# Patient Record
Sex: FEMALE | Race: WHITE | NOT HISPANIC OR LATINO | Employment: FULL TIME | URBAN - METROPOLITAN AREA
[De-identification: names, ages, dates, MRNs, and addresses within clinical notes are randomized per-mention and may not be internally consistent; named-entity substitution may affect disease eponyms.]

---

## 2021-09-22 ENCOUNTER — OFFICE VISIT (OUTPATIENT)
Dept: INTERNAL MEDICINE | Facility: CLINIC | Age: 31
End: 2021-09-22

## 2021-09-22 VITALS
HEIGHT: 64 IN | OXYGEN SATURATION: 96 % | SYSTOLIC BLOOD PRESSURE: 100 MMHG | DIASTOLIC BLOOD PRESSURE: 70 MMHG | BODY MASS INDEX: 22.65 KG/M2 | TEMPERATURE: 96.9 F | HEART RATE: 82 BPM | WEIGHT: 132.7 LBS

## 2021-09-22 DIAGNOSIS — F90.2 ATTENTION DEFICIT HYPERACTIVITY DISORDER (ADHD), COMBINED TYPE: Primary | ICD-10-CM

## 2021-09-22 LAB
ALBUMIN SERPL-MCNC: 5.1 G/DL (ref 3.5–5.2)
ALBUMIN/GLOB SERPL: 2.4 G/DL
ALP SERPL-CCNC: 68 U/L (ref 39–117)
ALT SERPL-CCNC: 36 U/L (ref 1–33)
AST SERPL-CCNC: 31 U/L (ref 1–32)
BASOPHILS # BLD AUTO: 0.05 10*3/MM3 (ref 0–0.2)
BASOPHILS NFR BLD AUTO: 0.7 % (ref 0–1.5)
BILIRUB SERPL-MCNC: 0.8 MG/DL (ref 0–1.2)
BUN SERPL-MCNC: 14 MG/DL (ref 6–20)
BUN/CREAT SERPL: 19.7 (ref 7–25)
CALCIUM SERPL-MCNC: 9.3 MG/DL (ref 8.6–10.5)
CHLORIDE SERPL-SCNC: 100 MMOL/L (ref 98–107)
CO2 SERPL-SCNC: 28 MMOL/L (ref 22–29)
CREAT SERPL-MCNC: 0.71 MG/DL (ref 0.57–1)
EOSINOPHIL # BLD AUTO: 0.15 10*3/MM3 (ref 0–0.4)
EOSINOPHIL NFR BLD AUTO: 2.1 % (ref 0.3–6.2)
ERYTHROCYTE [DISTWIDTH] IN BLOOD BY AUTOMATED COUNT: 12.8 % (ref 12.3–15.4)
GLOBULIN SER CALC-MCNC: 2.1 GM/DL
GLUCOSE SERPL-MCNC: 92 MG/DL (ref 65–99)
HCT VFR BLD AUTO: 42.7 % (ref 34–46.6)
HGB BLD-MCNC: 14.2 G/DL (ref 12–15.9)
IMM GRANULOCYTES # BLD AUTO: 0.04 10*3/MM3 (ref 0–0.05)
IMM GRANULOCYTES NFR BLD AUTO: 0.6 % (ref 0–0.5)
LYMPHOCYTES # BLD AUTO: 2.21 10*3/MM3 (ref 0.7–3.1)
LYMPHOCYTES NFR BLD AUTO: 31 % (ref 19.6–45.3)
MCH RBC QN AUTO: 29.7 PG (ref 26.6–33)
MCHC RBC AUTO-ENTMCNC: 33.3 G/DL (ref 31.5–35.7)
MCV RBC AUTO: 89.3 FL (ref 79–97)
MONOCYTES # BLD AUTO: 0.55 10*3/MM3 (ref 0.1–0.9)
MONOCYTES NFR BLD AUTO: 7.7 % (ref 5–12)
NEUTROPHILS # BLD AUTO: 4.13 10*3/MM3 (ref 1.7–7)
NEUTROPHILS NFR BLD AUTO: 57.9 % (ref 42.7–76)
NRBC BLD AUTO-RTO: 0 /100 WBC (ref 0–0.2)
PLATELET # BLD AUTO: 298 10*3/MM3 (ref 140–450)
POTASSIUM SERPL-SCNC: 4.4 MMOL/L (ref 3.5–5.2)
PROT SERPL-MCNC: 7.2 G/DL (ref 6–8.5)
RBC # BLD AUTO: 4.78 10*6/MM3 (ref 3.77–5.28)
SODIUM SERPL-SCNC: 138 MMOL/L (ref 136–145)
WBC # BLD AUTO: 7.13 10*3/MM3 (ref 3.4–10.8)

## 2021-09-22 PROCEDURE — 99204 OFFICE O/P NEW MOD 45 MIN: CPT | Performed by: STUDENT IN AN ORGANIZED HEALTH CARE EDUCATION/TRAINING PROGRAM

## 2021-09-22 RX ORDER — DEXTROAMPHETAMINE SACCHARATE, AMPHETAMINE ASPARTATE, DEXTROAMPHETAMINE SULFATE AND AMPHETAMINE SULFATE 3.75; 3.75; 3.75; 3.75 MG/1; MG/1; MG/1; MG/1
15 TABLET ORAL 2 TIMES DAILY
Qty: 60 TABLET | Status: CANCELLED | OUTPATIENT
Start: 2021-09-22

## 2021-09-22 RX ORDER — ESCITALOPRAM OXALATE 20 MG/1
20 TABLET ORAL DAILY
COMMUNITY
End: 2022-04-15 | Stop reason: SDUPTHER

## 2021-09-22 RX ORDER — DEXTROAMPHETAMINE SACCHARATE, AMPHETAMINE ASPARTATE, DEXTROAMPHETAMINE SULFATE AND AMPHETAMINE SULFATE 3.75; 3.75; 3.75; 3.75 MG/1; MG/1; MG/1; MG/1
TABLET ORAL
COMMUNITY
End: 2021-09-30 | Stop reason: SDUPTHER

## 2021-09-22 NOTE — PROGRESS NOTES
Joel Manley D.O.  Internal Medicine  [facility]  [address]  [place] [address]  [phone]      Chief Complaint  Establish Care, Annual Exam, Med Refill, and Anxiety    SUBJECTIVE    History of Present Illness    Suki Lopez is a 31 y.o. female who presents to the office today as a new patient to establish care.     She has been seeing [facility],a  nurse practitioner there but doesn't know her name.    ADHD: Diagnosed in 2011 when she had j0ust started college. She underwent a bunch of surveys and was told she had ADHD. She tried vyvanse and it didn't work very well per her report, wore off pretty quickly. Her last Rx of Adderall was approximately 2 years ago and has been taking it as needed, last dose 2 days ago. Taking 15 mg adderall twice daily.   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 She feels different when taking Adderall, but then states that it is just super focused which is not typical for her.   She feels that her primary symptoms are fidgiting and inability to stay on task.     Anxiety: she feels a dramatic improvement on 20 mg escitalopram, does not need a refill at this time.    At home sleep study was done 2 weeks ago with [facility], still pending results.       Review of Systems   Constitutional: Negative for fever.   Respiratory: Negative for shortness of breath.    Cardiovascular: Negative for chest pain and leg swelling.   Gastrointestinal: Negative for abdominal pain and blood in stool.       Allergies   Allergen Reactions   • Cefaclor Anaphylaxis   • Gadolinium Derivatives Anaphylaxis   • Penicillins Anaphylaxis     Category: Allergy;      • Sulfa Antibiotics Anaphylaxis        Outpatient Medications Marked as Taking for the 9/22/21 encounter (Office Visit) with Joel Manley, DO   Medication Sig Dispense Refill   • amphetamine-dextroamphetamine (ADDERALL) 15 MG tablet Take  by mouth.     •  "escitalopram (LEXAPRO) 20 MG tablet Take 20 mg by mouth Daily.          Past Medical History:   Diagnosis Date   • ADHD (attention deficit hyperactivity disorder)    • Anxiety      Past Surgical History:   Procedure Laterality Date   • APPENDECTOMY  2015   • EYE MUSCLE SURGERY     • FINGER FRACTURE SURGERY Right 2000    2nd finger     Family History   Problem Relation Age of Onset   • Anxiety disorder Mother    • Sleep apnea Mother    • Diabetes type II Mother    • No Known Problems Father    • No Known Problems Brother    • Stroke Maternal Grandmother    • Diabetes type II Maternal Grandmother    • Breast cancer Paternal Grandmother 70   • Prostate cancer Paternal Grandfather     reports that she has never smoked. She has never used smokeless tobacco. She reports current alcohol use of about 3.0 standard drinks of alcohol per week. She reports that she does not use drugs.    OBJECTIVE    Vital Signs:   /70   Pulse 82   Temp 96.9 °F (36.1 °C) (Temporal)   Ht 162.6 cm (64\")   Wt 60.2 kg (132 lb 11.2 oz)   SpO2 96%   BMI 22.78 kg/m²     Physical Exam  Vitals reviewed.   Constitutional:       Appearance: She is normal weight.   HENT:      Head: Normocephalic and atraumatic.      Right Ear: Tympanic membrane, ear canal and external ear normal. There is no impacted cerumen.      Left Ear: Tympanic membrane, ear canal and external ear normal. There is no impacted cerumen.   Eyes:      General: No scleral icterus.     Pupils: Pupils are equal, round, and reactive to light.   Cardiovascular:      Rate and Rhythm: Normal rate and regular rhythm.      Heart sounds: Normal heart sounds. No murmur heard.     Pulmonary:      Effort: Pulmonary effort is normal. No respiratory distress.      Breath sounds: Normal breath sounds. No wheezing.   Lymphadenopathy:      Cervical: No cervical adenopathy.   Neurological:      Mental Status: She is alert and oriented to person, place, and time.   Psychiatric:         Mood and " Affect: Mood normal.         Behavior: Behavior normal.                             ASSESSMENT & PLAN     Diagnoses and all orders for this visit:    1. Attention deficit hyperactivity disorder (ADHD), combined type (Primary)  2. Anxiety Disorder  -Pt has approximate 10 year history of ADHD, previously prescribed Vyvanse and Adderall with no improvement on Vyvanse but improvement on Adderall. Adult ADHD-RS-IV today in office is 34, combined type but worse in attention deficit category. SAIMA reviewed and is negative for past year. She does been taking Adderall 15 mg twice daily as needed with a refill that is several years old, but with her residency intensifying she is going to need regular medication. She has concomitant anxiety which feels well controlled on escitalopram 20 mg daily per her report. PHQ9 is 4 and JOSH 7 is 4 today as well. Discussed atomoxetine with the patient as a noncontrolled treatment for ADHD but she is comfortable with Adderall. Will obtain compliance drug screen today and check renal function and continue Adderall for her after I get those results.  -     Compliance Drug Analysis, Ur - Urine, Clean Catch  -     Comprehensive metabolic panel  -     CBC w AUTO Differential                The following social determinates of health impact the patient's medical decision making: No social determinates of health were factored in to today's visit.     Follow Up  Return in about 4 weeks (around 10/20/2021) for Annual physical.    Patient/family had no further questions at this time and verbalized understanding of the plan discussed today.

## 2021-09-24 ENCOUNTER — PATIENT ROUNDING (BHMG ONLY) (OUTPATIENT)
Dept: INTERNAL MEDICINE | Facility: CLINIC | Age: 31
End: 2021-09-24

## 2021-09-24 NOTE — PROGRESS NOTES
September 24, 2021    Hello, may I speak with Suki Lopez?    My name is Ina Kasey, Practice Manager    I am  with [facility]  [address] [address] [address] [phone].    Before we get started may I verify your date of birth? 1990    I am calling to officially welcome you to our practice and ask about your recent visit. Is this a good time to talk?     Tell me about your visit with us. LEFT VOICE MESSAGE       We're always looking for ways to make our patients' experiences even better. Do you have recommendations on ways we may improve?  {    Overall were you satisfied with your first visit to our practice?       I appreciate you taking the time to speak with me today. Is there anything else I can do for you      Thank you, and have a great day.

## 2021-09-27 ENCOUNTER — PATIENT MESSAGE (OUTPATIENT)
Dept: INTERNAL MEDICINE | Facility: CLINIC | Age: 31
End: 2021-09-27

## 2021-09-27 NOTE — TELEPHONE ENCOUNTER
From: Suki Lopez  To: Joel Manley DO  Sent: 9/27/2021 10:23 AM EDT  Subject: Prescription Question    Good morning Dr. Manley    I wanted to check and see if the UDS from last week was resulted and if the adderall was able to be sent. If there is anything I need to do on my end, please let me know.    Thank you!    Susanna Lopez

## 2021-09-29 LAB — DRUGS UR: NORMAL

## 2021-09-30 DIAGNOSIS — F90.2 ATTENTION DEFICIT HYPERACTIVITY DISORDER (ADHD), COMBINED TYPE: Primary | ICD-10-CM

## 2021-09-30 RX ORDER — DEXTROAMPHETAMINE SACCHARATE, AMPHETAMINE ASPARTATE, DEXTROAMPHETAMINE SULFATE AND AMPHETAMINE SULFATE 3.75; 3.75; 3.75; 3.75 MG/1; MG/1; MG/1; MG/1
15 TABLET ORAL 2 TIMES DAILY
Qty: 60 TABLET | Refills: 0 | Status: SHIPPED | OUTPATIENT
Start: 2021-09-30 | End: 2021-11-09 | Stop reason: SDUPTHER

## 2021-10-18 ENCOUNTER — OFFICE VISIT (OUTPATIENT)
Dept: INTERNAL MEDICINE | Facility: CLINIC | Age: 31
End: 2021-10-18

## 2021-10-18 VITALS
DIASTOLIC BLOOD PRESSURE: 60 MMHG | TEMPERATURE: 97.4 F | SYSTOLIC BLOOD PRESSURE: 110 MMHG | BODY MASS INDEX: 22.36 KG/M2 | HEART RATE: 87 BPM | OXYGEN SATURATION: 95 % | HEIGHT: 64 IN | WEIGHT: 131 LBS

## 2021-10-18 DIAGNOSIS — Z23 NEED FOR IMMUNIZATION AGAINST INFLUENZA: ICD-10-CM

## 2021-10-18 DIAGNOSIS — F90.2 ATTENTION DEFICIT HYPERACTIVITY DISORDER (ADHD), COMBINED TYPE: Primary | ICD-10-CM

## 2021-10-18 PROCEDURE — 90686 IIV4 VACC NO PRSV 0.5 ML IM: CPT | Performed by: STUDENT IN AN ORGANIZED HEALTH CARE EDUCATION/TRAINING PROGRAM

## 2021-10-18 PROCEDURE — 90471 IMMUNIZATION ADMIN: CPT | Performed by: STUDENT IN AN ORGANIZED HEALTH CARE EDUCATION/TRAINING PROGRAM

## 2021-10-18 PROCEDURE — 99214 OFFICE O/P EST MOD 30 MIN: CPT | Performed by: STUDENT IN AN ORGANIZED HEALTH CARE EDUCATION/TRAINING PROGRAM

## 2021-10-18 NOTE — PROGRESS NOTES
Joel Manley D.O.  Internal Medicine  [facility]  [address]  [place] [address]  [phone]      Chief Complaint  Med Refill    SUBJECTIVE    History of Present Illness    Suki Lopez is a 31 y.o. female who presents to the office today as an established patient that last saw me on 9/22/2021. Pt is here for short term follow up of ADHD.     ADHD: like night and day since starting adderall 15 mg twice daily at last visit, she is now asking herself why she even stopped it before. No palpitations, no difficulty sleep. Notes her appetite is a little less. 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   Anxiety: feels like it is controlled on escitalopram.     She is applying to pediatric heme/onc fellowship right now, will likely be moving somewhere else in the country within less than a year and would like to defer annual exam until that time. 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       Allergies   Allergen Reactions   • Cefaclor Anaphylaxis   • Gadolinium Derivatives Anaphylaxis   • Penicillins Anaphylaxis     Category: Allergy;      • Sulfa Antibiotics Anaphylaxis        Outpatient Medications Marked as Taking for the 10/18/21 encounter (Office Visit) with Joel Manley, DO   Medication Sig Dispense Refill   • amphetamine-dextroamphetamine (ADDERALL) 15 MG tablet Take 1 tablet by mouth 2 (Two) Times a Day. 60 tablet 0   • escitalopram (LEXAPRO) 20 MG tablet Take 20 mg by mouth Daily.          Past Medical History:   Diagnosis Date   • ADHD (attention deficit hyperactivity disorder)    • Anxiety      Past Surgical History:   Procedure Laterality Date   • APPENDECTOMY  2015   • EYE MUSCLE SURGERY     • FINGER FRACTURE SURGERY Right 2000    2nd finger     Family History   Problem Relation Age of Onset   • Anxiety disorder Mother    • Sleep apnea Mother    • Diabetes type II Mother   "  • No Known Problems Father    • No Known Problems Brother    • Stroke Maternal Grandmother    • Diabetes type II Maternal Grandmother    • Breast cancer Paternal Grandmother 70   • Prostate cancer Paternal Grandfather     reports that she has never smoked. She has never used smokeless tobacco. She reports current alcohol use of about 3.0 standard drinks of alcohol per week. She reports that she does not use drugs.    OBJECTIVE    Vital Signs:   /60   Pulse 87   Temp 97.4 °F (36.3 °C) (Temporal)   Ht 162.6 cm (64\")   Wt 59.4 kg (131 lb)   SpO2 95%   BMI 22.49 kg/m²     Physical Exam  Vitals reviewed.   Constitutional:       General: She is not in acute distress.     Appearance: Normal appearance. She is not ill-appearing.   HENT:      Head: Normocephalic and atraumatic.   Eyes:      General: No scleral icterus.  Cardiovascular:      Rate and Rhythm: Normal rate and regular rhythm.      Heart sounds: Normal heart sounds. No murmur heard.      Pulmonary:      Effort: Pulmonary effort is normal. No respiratory distress.      Breath sounds: Normal breath sounds. No wheezing.   Neurological:      Mental Status: She is alert.   Psychiatric:         Mood and Affect: Mood normal.         Behavior: Behavior normal.            The following data was reviewed by: Joel Manley DO on 10/18/2021:  Common labs    Common Labsle 9/22/21 9/22/21    0921 0921   Glucose 92    BUN 14    Creatinine 0.71    eGFR Non  Am 97    eGFR African Am 118    Sodium 138    Potassium 4.4    Chloride 100    Calcium 9.3    Total Protein 7.2    Albumin 5.10    Total Bilirubin 0.8    Alkaline Phosphatase 68    AST (SGOT) 31    ALT (SGPT) 36 (A)    WBC  7.13   Hemoglobin  14.2   Hematocrit  42.7   Platelets  298   (A) Abnormal value       Comments are available for some flowsheets but are not being displayed.                        ASSESSMENT & PLAN     Diagnoses and all orders for this visit:    1. Attention deficit hyperactivity " disorder (ADHD), combined type (Primary)  2.Anxiety  -Pt reports significant improvement on adderall 15 mg twice daily, started last month after having an ADHD RS IV of 34. 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 Will continue current treatment and bring her back in 5 months for repeat urine drug testing and follow up. I reviewed her SAIMA today and it is appropriate only for what I am prescribing.    -Anxiety remains controlled on escitalopram 20 mg daily, continue.    3. Need for immunization against influenza  -     FluLaval/Fluarix/Fluzone >6 Months            The following social determinates of health impact the patient's medical decision making: No social determinates of health were factored in to today's visit.     Follow Up  Return in about 5 months (around 3/18/2022) for Recheck.    Patient/family had no further questions at this time and verbalized understanding of the plan discussed today.

## 2021-10-18 NOTE — PROGRESS NOTES
"    Chief Complaint  Annual checkup    HISTORY    Suki Lopez is a 31 y.o. female who presents to the office today as a  an established patient for their annual preventative exam. Their last preventative exam was _.     {Hospitalization(s) history:[id number]::\"No hospitalization(s) within the last year.\"}     New medical conditions/surgeries/providers since last visit:     Status of chronic medical conditions:    Patient's overall comments about their health or any other specific health concerns they report:    First day of last menstrual period if pre-menopausal:     Patient's contraception status (if applicable):       Health Maintenance Summary          Overdue - ANNUAL PHYSICAL (Yearly) Overdue - never done    No completion history exists for this topic.          Overdue - TDAP/TD VACCINES (1 - Tdap) Overdue - never done    No completion history exists for this topic.          Ordered - INFLUENZA VACCINE (Yearly - August to March) Ordered on 10/18/2021    10/06/2020  Imm Admin: Flu Vaccine Quad PF >36MO          Overdue - HEPATITIS C SCREENING (Once) Overdue - never done    No completion history exists for this topic.          PAP SMEAR (Every 3 Years) Next due on 10/14/2023    10/14/2020  Outside Procedure: [place] CYTOPATH CERV/VAG THIN LAYER          COVID-19 Vaccine (Series Information) Completed    01/18/2021  Imm Admin: COVID-19 (PFIZER)    12/28/2020  Imm Admin: COVID-19 (PFIZER)          Pneumococcal Vaccine 0-64 (Series Information) Aged Out    No completion history exists for this topic.                 Allergies   Allergen Reactions   • Cefaclor Anaphylaxis   • Gadolinium Derivatives Anaphylaxis   • Penicillins Anaphylaxis     Category: Allergy;      • Sulfa Antibiotics Anaphylaxis        No outpatient medications have been marked as taking for the 10/18/21 encounter (Office Visit) with Joel Manley DO.        {new/est histories:12146}    Immunization History   Administered Date(s) Administered " "  • COVID-19 (PFIZER) 12/28/2020, 01/18/2021   • Flu Vaccine Quad PF >36MO 10/06/2020   • PPD Test 05/12/2020        OBJECTIVE    Vital Signs:   /60   Pulse 87   Temp 97.4 °F (36.3 °C) (Temporal)   Ht 162.6 cm (64\")   Wt 59.4 kg (131 lb)   SpO2 95%   BMI 22.49 kg/m²     Physical Exam     {The following data was reviewed by (Optional):70819}  {Ambulatory Labs (Optional):23166}  {Data reviewed (Optional):12851:::1}           ASSESSMENT & PLAN     #Annual Preventative Health Examination   -Age and sex appropriate physical exam performed and documented. Updated past medical, family, social and surgical histories as well as allergies and care team list. Addressed care gaps listed in the medical record.  -Encouraged seat belt use for every car ride for patient and all occupants. Discussed securing of all guns in the home for patient and family protection. Encouraged sunscreen use to reduce risk of skin cancer for any days with sun exposure over 20 minutes. Recommended helmet if biking or riding motorcycle to prevent head trauma. Discussed the importance of smoke and carbon monoxide detectors in the home.   -Encouraged annual dental and vision exams as part of their overall health.  -Encouraged minimum of 30 minutes or more of exercise at a brisk walk or higher 5 days per week combined with a well-balanced diet.  -Immunizations reviewed and updated in EMR.  -Advised that all women who are planning or capable of pregnancy take a daily supplement containing 0.4 to 0.8 mg (400 to 800 ?g) of folic acid.  The ASCVD Risk score (Cantil DC Jr., et al., 2013) failed to calculate for the following reasons:    The 2013 ASCVD risk score is only valid for ages 40 to 79   -Lipid screening:  {kdmlipidcpe:16941}   -Aspirin for primary or secondary prevention: {kdmaspirincpe:09293}  -Depression screening: PHQ2 performed and the patient's screen was {POSITIVE/NEGATIVE:79927}.  -Diabetes screening:  {kdmdiabetescpe:55951} " "  -Tobacco use screening: Patient {REPORTS/DENIES EC:79878} cigarette use. Tobacco counseling was {declined/accepted/comment:66536}. Advised patient that vaping is discouraged and data is beginning to support that it is associated with adverse health outcomes as well.  -Alcohol use screening: {kdmalcoholcpe:23454}  -Illicit drug screening: Patient {DOES/DOES NOT:70824} use illicit drugs.   -Hypertension screening: {kdmhypertensioncpe:88681}  -HIV screening: {kdmhivscreeningCPE:68830}   -Syphilis screening: {kdmsyphilisscreenCPE:44732::\"Syphilis screening not indicated.\"}  -Hepatitis B virus screening: {kdmhepBcpe:43933}  -Hepatitis C virus screening:  {kdmhepCcpe:82338}  -Colon cancer screening: {kdmcoloncancerscreeningcpe:07408}  -Lung cancer screening: {kdmlungcancerscreeningcpe:51639::\"Patient has never smoked.\"}  -Cervical cancer screening:  Informed patient that the USPSTF recommends screening for cervical cancer every 3 years with cervical cytology alone in women aged 21 to 29 years. For women aged 30 to 65 years, the USPSTF recommends screening every 3 years with cervical cytology alone, every 5 years with high-risk human papillomavirus (hrHPV) testing alone, or every 5 years with HPV testing in combination with cytology (cotesting). {Lasp pap (Optional):62046}   -Breast cancer screening: Informed patient that the USPSTF recommends biennial screening mammography for women aged 50 to 74 years. {Last mammogram:47509}  -BRCA related cancer screening: Informed patient that the USPSTF recommends that primary care clinicians assess women with a personal or family history of breast, ovarian, tubal, or peritoneal cancer or who have an ancestry associated with breast cancer susceptibility 1 and 2 (BRCA1/2) gene mutations with an appropriate brief familial risk assessment tool and referred to genetic counseling if risk assessment is positive. Patient {DOES/DOES NOT:04358} have a known personal or family history. " "  -Osteoporosis screening: Informed patient that the USPSTF recommends screening for osteoporosis with bone measurement testing to prevent osteoporotic fractures in women 65 years and older. {Screening for Osteoporosis:82959::\"screening is not indicated at this time. \"}    Follow up in 1 year for annual physical exam.    Patient/family had no further questions at this time and verbalized understanding of the plan discussed today.   "

## 2021-11-09 DIAGNOSIS — F90.2 ATTENTION DEFICIT HYPERACTIVITY DISORDER (ADHD), COMBINED TYPE: ICD-10-CM

## 2021-11-10 RX ORDER — DEXTROAMPHETAMINE SACCHARATE, AMPHETAMINE ASPARTATE, DEXTROAMPHETAMINE SULFATE AND AMPHETAMINE SULFATE 3.75; 3.75; 3.75; 3.75 MG/1; MG/1; MG/1; MG/1
15 TABLET ORAL 2 TIMES DAILY
Qty: 60 TABLET | Refills: 0 | Status: SHIPPED | OUTPATIENT
Start: 2021-11-10 | End: 2021-12-09 | Stop reason: SDUPTHER

## 2021-12-09 DIAGNOSIS — F90.2 ATTENTION DEFICIT HYPERACTIVITY DISORDER (ADHD), COMBINED TYPE: ICD-10-CM

## 2021-12-10 RX ORDER — DEXTROAMPHETAMINE SACCHARATE, AMPHETAMINE ASPARTATE, DEXTROAMPHETAMINE SULFATE AND AMPHETAMINE SULFATE 3.75; 3.75; 3.75; 3.75 MG/1; MG/1; MG/1; MG/1
15 TABLET ORAL 2 TIMES DAILY
Qty: 60 TABLET | Refills: 0 | Status: SHIPPED | OUTPATIENT
Start: 2021-12-10 | End: 2022-01-11 | Stop reason: SDUPTHER

## 2022-01-11 DIAGNOSIS — F90.2 ATTENTION DEFICIT HYPERACTIVITY DISORDER (ADHD), COMBINED TYPE: ICD-10-CM

## 2022-01-12 RX ORDER — DEXTROAMPHETAMINE SACCHARATE, AMPHETAMINE ASPARTATE, DEXTROAMPHETAMINE SULFATE AND AMPHETAMINE SULFATE 3.75; 3.75; 3.75; 3.75 MG/1; MG/1; MG/1; MG/1
15 TABLET ORAL 2 TIMES DAILY
Qty: 60 TABLET | Refills: 0 | Status: SHIPPED | OUTPATIENT
Start: 2022-01-12 | End: 2022-02-14 | Stop reason: SDUPTHER

## 2022-02-14 DIAGNOSIS — F90.2 ATTENTION DEFICIT HYPERACTIVITY DISORDER (ADHD), COMBINED TYPE: ICD-10-CM

## 2022-02-14 RX ORDER — DEXTROAMPHETAMINE SACCHARATE, AMPHETAMINE ASPARTATE, DEXTROAMPHETAMINE SULFATE AND AMPHETAMINE SULFATE 3.75; 3.75; 3.75; 3.75 MG/1; MG/1; MG/1; MG/1
15 TABLET ORAL 2 TIMES DAILY
Qty: 60 TABLET | Refills: 0 | Status: SHIPPED | OUTPATIENT
Start: 2022-02-14 | End: 2022-03-16 | Stop reason: SDUPTHER

## 2022-03-16 DIAGNOSIS — F90.2 ATTENTION DEFICIT HYPERACTIVITY DISORDER (ADHD), COMBINED TYPE: ICD-10-CM

## 2022-03-16 RX ORDER — DEXTROAMPHETAMINE SACCHARATE, AMPHETAMINE ASPARTATE, DEXTROAMPHETAMINE SULFATE AND AMPHETAMINE SULFATE 3.75; 3.75; 3.75; 3.75 MG/1; MG/1; MG/1; MG/1
15 TABLET ORAL 2 TIMES DAILY
Qty: 60 TABLET | Refills: 0 | Status: SHIPPED | OUTPATIENT
Start: 2022-03-16 | End: 2022-04-15

## 2022-04-15 ENCOUNTER — OFFICE VISIT (OUTPATIENT)
Dept: INTERNAL MEDICINE | Facility: CLINIC | Age: 32
End: 2022-04-15

## 2022-04-15 VITALS
HEART RATE: 120 BPM | WEIGHT: 132 LBS | BODY MASS INDEX: 22.53 KG/M2 | DIASTOLIC BLOOD PRESSURE: 70 MMHG | HEIGHT: 64 IN | TEMPERATURE: 98 F | SYSTOLIC BLOOD PRESSURE: 102 MMHG | OXYGEN SATURATION: 98 %

## 2022-04-15 DIAGNOSIS — F41.9 ANXIETY: ICD-10-CM

## 2022-04-15 DIAGNOSIS — F90.2 ATTENTION DEFICIT HYPERACTIVITY DISORDER (ADHD), COMBINED TYPE: Primary | ICD-10-CM

## 2022-04-15 DIAGNOSIS — L85.8 KERATOSIS PILARIS: ICD-10-CM

## 2022-04-15 PROCEDURE — 99214 OFFICE O/P EST MOD 30 MIN: CPT | Performed by: STUDENT IN AN ORGANIZED HEALTH CARE EDUCATION/TRAINING PROGRAM

## 2022-04-15 RX ORDER — ESCITALOPRAM OXALATE 20 MG/1
20 TABLET ORAL DAILY
Qty: 90 TABLET | Refills: 0 | Status: SHIPPED | OUTPATIENT
Start: 2022-04-15

## 2022-04-15 RX ORDER — TRETINOIN 0.5 MG/G
1 CREAM TOPICAL NIGHTLY
Qty: 45 G | Refills: 0 | Status: SHIPPED | OUTPATIENT
Start: 2022-04-15 | End: 2022-06-20 | Stop reason: SDUPTHER

## 2022-04-15 RX ORDER — DEXTROAMPHETAMINE SACCHARATE, AMPHETAMINE ASPARTATE, DEXTROAMPHETAMINE SULFATE AND AMPHETAMINE SULFATE 5; 5; 5; 5 MG/1; MG/1; MG/1; MG/1
20 TABLET ORAL 2 TIMES DAILY
Qty: 60 TABLET | Refills: 0 | Status: SHIPPED | OUTPATIENT
Start: 2022-04-15 | End: 2022-05-19 | Stop reason: SDUPTHER

## 2022-04-15 NOTE — PROGRESS NOTES
"  Joel Manley D.O.  Internal Medicine  [facility]  [address]  [place] [address]  [phone]      Chief Complaint  ADD and Anxiety    SUBJECTIVE    History of Present Illness    Susanna Duke is a 32 y.o. female who presents to the office today as an established patient that last saw me on 10/18/2021.     ADHD: taking adderall 15 mg twice daily, states she feels that she has gotten adjusted to it and it is not working as well as she once thought.  Inability to concentrate and focus are the biggest things she continues to deal with. She is still fidgety but she states she can work through that. No side effects that she can appreciate such as palpitations, chest pain, unexpected weight loss. Still feels anxiety is well controlled on escitalopram 20 mg daily however.     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     Allergies   Allergen Reactions   • Cefaclor Anaphylaxis   • Gadolinium Derivatives Anaphylaxis   • Penicillins Anaphylaxis     Category: Allergy;      • Sulfa Antibiotics Anaphylaxis        Outpatient Medications Marked as Taking for the 4/15/22 encounter (Office Visit) with Joel Manley, DO   Medication Sig Dispense Refill   • escitalopram (LEXAPRO) 20 MG tablet Take 1 tablet by mouth Daily. 90 tablet 0   • [DISCONTINUED] amphetamine-dextroamphetamine (ADDERALL) 15 MG tablet Take 1 tablet by mouth 2 (Two) Times a Day. 60 tablet 0   • [DISCONTINUED] escitalopram (LEXAPRO) 20 MG tablet Take 20 mg by mouth Daily.          Past Medical History:   Diagnosis Date   • ADHD (attention deficit hyperactivity disorder)    • Anxiety        OBJECTIVE    Vital Signs:   /70   Pulse 120   Temp 98 °F (36.7 °C)   Ht 162.6 cm (64\")   Wt 59.9 kg (132 lb)   SpO2 98%   BMI 22.66 kg/m²     Physical Exam  Vitals reviewed.   Constitutional:       General: She is not in acute distress.     Appearance: Normal appearance. She is normal weight. " She is not ill-appearing.   HENT:      Head: Atraumatic.   Eyes:      General: No scleral icterus.  Cardiovascular:      Rate and Rhythm: Regular rhythm. Tachycardia present.      Heart sounds: Normal heart sounds. No murmur heard.  Pulmonary:      Effort: Pulmonary effort is normal. No respiratory distress.      Breath sounds: Normal breath sounds. No wheezing or rhonchi.   Skin:     Comments: Keratosis pilaris present LUE   Neurological:      Mental Status: She is alert.   Psychiatric:         Mood and Affect: Mood normal.         Behavior: Behavior normal.         Thought Content: Thought content normal.                             ASSESSMENT & PLAN     Diagnoses and all orders for this visit:    1. Attention deficit hyperactivity disorder (ADHD), combined type (Primary)  2. Anxiety  -pt presents today for follow up of ADHD  -unfortunately no longer having as much subjective improvement on adderall 15 mg twice daily as she was before  -ADHD RS IV in office today is 18 for attention deficit and 13 for hyperactivity, indicating moderate to severe persistent symptoms   -anxiety is overall well controlled per her report on escitalopram 20 mg daily, continue  -she has had no side effects from current adderall dosing as of yet; weight is stable and she is slightly tachycardic on exam but reports not drinking much water on recent work shifts ... encouraged her to increase water intake and focus on self care in that regard  -will increase adderall to 20 mg twice daily, advised her to be on the lookout for palpitations and other side effects as they can increase as dosage increases  -SAIMA reviewed manually under her previous name and is appropriate   -yearly UDS is up to date  -     escitalopram (LEXAPRO) 20 MG tablet; Take 1 tablet by mouth Daily.  Dispense: 90 tablet; Refill: 0    3.Keratosis pilaris  -no improvement with otc topical regimen  -will do trial of tretinoin cream for up to 12 weeks        The following  social determinates of health impact the patient's medical decision making: No social determinates of health were factored in to today's visit.     Follow Up  No follow-ups on file.    Patient/family had no further questions at this time and verbalized understanding of the plan discussed today.

## 2022-05-19 DIAGNOSIS — F90.2 ATTENTION DEFICIT HYPERACTIVITY DISORDER (ADHD), COMBINED TYPE: ICD-10-CM

## 2022-05-19 RX ORDER — DEXTROAMPHETAMINE SACCHARATE, AMPHETAMINE ASPARTATE, DEXTROAMPHETAMINE SULFATE AND AMPHETAMINE SULFATE 5; 5; 5; 5 MG/1; MG/1; MG/1; MG/1
20 TABLET ORAL 2 TIMES DAILY
Qty: 60 TABLET | Refills: 0 | Status: SHIPPED | OUTPATIENT
Start: 2022-05-19 | End: 2022-06-20 | Stop reason: SDUPTHER

## 2022-06-20 DIAGNOSIS — F90.2 ATTENTION DEFICIT HYPERACTIVITY DISORDER (ADHD), COMBINED TYPE: ICD-10-CM

## 2022-06-20 RX ORDER — DEXTROAMPHETAMINE SACCHARATE, AMPHETAMINE ASPARTATE, DEXTROAMPHETAMINE SULFATE AND AMPHETAMINE SULFATE 5; 5; 5; 5 MG/1; MG/1; MG/1; MG/1
20 TABLET ORAL 2 TIMES DAILY
Qty: 60 TABLET | Refills: 0 | Status: SHIPPED | OUTPATIENT
Start: 2022-06-20

## 2022-06-20 RX ORDER — TRETINOIN 0.5 MG/G
1 CREAM TOPICAL NIGHTLY
Qty: 45 G | Refills: 0 | Status: SHIPPED | OUTPATIENT
Start: 2022-06-20